# Patient Record
Sex: FEMALE | Race: BLACK OR AFRICAN AMERICAN | NOT HISPANIC OR LATINO | Employment: UNEMPLOYED | URBAN - METROPOLITAN AREA
[De-identification: names, ages, dates, MRNs, and addresses within clinical notes are randomized per-mention and may not be internally consistent; named-entity substitution may affect disease eponyms.]

---

## 2017-03-17 ENCOUNTER — HOSPITAL ENCOUNTER (OUTPATIENT)
Dept: OBSTETRICS AND GYNECOLOGY | Facility: HOSPITAL | Age: 27
Discharge: HOME | End: 2017-03-17
Attending: OBSTETRICS & GYNECOLOGY
Payer: MEDICAID

## 2017-03-17 DIAGNOSIS — Z3A.36 36 WEEKS GESTATION OF PREGNANCY: ICD-10-CM

## 2017-03-17 PROCEDURE — 96372 THER/PROPH/DIAG INJ SC/IM: CPT

## 2017-07-18 ENCOUNTER — HOSPITAL ENCOUNTER (EMERGENCY)
Facility: HOSPITAL | Age: 27
Discharge: HOME | End: 2017-07-18
Attending: EMERGENCY MEDICINE
Payer: MEDICAID

## 2017-07-18 VITALS
BODY MASS INDEX: 26.68 KG/M2 | HEART RATE: 95 BPM | HEIGHT: 62 IN | WEIGHT: 145 LBS | RESPIRATION RATE: 13 BRPM | DIASTOLIC BLOOD PRESSURE: 86 MMHG | TEMPERATURE: 99 F | SYSTOLIC BLOOD PRESSURE: 138 MMHG | OXYGEN SATURATION: 99 %

## 2017-07-18 DIAGNOSIS — K08.89 TOOTHACHE: Primary | ICD-10-CM

## 2017-07-18 DIAGNOSIS — K08.89 PAIN, DENTAL: ICD-10-CM

## 2017-07-18 LAB
B-HCG UR QL: NEGATIVE
CTP QC/QA: YES

## 2017-07-18 PROCEDURE — 99283 EMERGENCY DEPT VISIT LOW MDM: CPT

## 2017-07-18 RX ORDER — IBUPROFEN 600 MG/1
600 TABLET ORAL EVERY 6 HOURS PRN
Qty: 20 TABLET | Refills: 0 | Status: SHIPPED | OUTPATIENT
Start: 2017-07-18

## 2017-07-18 RX ORDER — PENICILLIN V POTASSIUM 500 MG/1
500 TABLET, FILM COATED ORAL 4 TIMES DAILY
Qty: 28 TABLET | Refills: 0 | Status: SHIPPED | OUTPATIENT
Start: 2017-07-18 | End: 2017-07-25

## 2017-07-19 NOTE — ED PROVIDER NOTES
Encounter Date: 2017       History     Chief Complaint   Patient presents with    Dental Pain     right sided dental pain X 2 days.      27-year-old female presents to emergency department complaining of right upper gumline tooth ache ×3 days.  Patient states her dentist is Dr. Tomas however she has not had time to appointment with Dr. Tomas.  She has a silver cap over the tooth that is causing the most pain reports redness to the gums around that area.  Denies trouble swallowing reports pain with eating.  Denies jaw injury or mouth injury.  Denies fevers chills or body aches.  Has not taken any medications.  Has been applying ice pack to face since arrival in the emergency department.          Review of patient's allergies indicates:  No Known Allergies  Past Medical History:   Diagnosis Date    Anemia      Past Surgical History:   Procedure Laterality Date     SECTION      none       Family History   Problem Relation Age of Onset    Asthma Mother     Cancer Maternal Grandmother     Diabetes Maternal Grandmother      Social History   Substance Use Topics    Smoking status: Never Smoker    Smokeless tobacco: Not on file    Alcohol use No     Review of Systems   Constitutional: Negative for activity change, appetite change, chills and fever.   HENT: Positive for dental problem. Negative for congestion, ear pain, postnasal drip, rhinorrhea, sore throat, trouble swallowing and voice change.    Respiratory: Negative for shortness of breath.    Cardiovascular: Negative for chest pain.   Gastrointestinal: Negative for nausea.   Genitourinary: Negative for dysuria.   Musculoskeletal: Negative for back pain.   Skin: Negative for rash.   Neurological: Negative for weakness.   Hematological: Does not bruise/bleed easily.   All other systems reviewed and are negative.      Physical Exam     Initial Vitals [17 2050]   BP Pulse Resp Temp SpO2   138/86 95 13 98.6 °F (37 °C) 99 %      MAP        103.33         Physical Exam    Nursing note and vitals reviewed.  Constitutional: She appears well-developed and well-nourished. No distress.   HENT:   Head: Normocephalic.   Right Ear: Tympanic membrane normal.   Left Ear: Tympanic membrane normal.   Mouth/Throat: Uvula is midline and mucous membranes are normal. No oral lesions. Dental caries present. No dental abscesses or uvula swelling. No oropharyngeal exudate, posterior oropharyngeal edema or posterior oropharyngeal erythema.       Eyes: Conjunctivae are normal.   Neck: Normal range of motion. Neck supple.   Cardiovascular: Normal rate.   Pulmonary/Chest: Breath sounds normal. No respiratory distress.   Abdominal: Soft. Bowel sounds are normal. She exhibits no distension and no abdominal bruit. There is no tenderness. There is no rebound and no guarding. No hernia.   Neurological: She is alert and oriented to person, place, and time.   Skin: Skin is warm and dry.   Psychiatric: She has a normal mood and affect. Her behavior is normal. Judgment and thought content normal.         ED Course   Procedures  Labs Reviewed - No data to display          Medical Decision Making:   Initial Assessment:   27-year-old female presents to emergency department complaining of right upper gumline tooth ache ×3 days.  Patient states her dentist is Dr. Tomas however she has not had time to appointment with Dr. Tomas.  She has a silver cap over the tooth that is causing the most pain reports redness to the gums around that area.  Denies trouble swallowing reports pain with eating.  Denies jaw injury or mouth injury.  Denies fevers chills or body aches.  Has not taken any medications.  Has been applying ice pack to face since arrival in the emergency department.  Patient states icepack is helping with pain.  No signs of abscess in oral cavity.  Gumline is slightly erythematous around painful area.  Area is slightly tender to palpation.  Differential Diagnosis:   Dental  abscess, cavity, jaw fracture, tooth decay  ED Management:  Patient instructed to call Dr. Tomas for a dental appointment.  We'll prescribe antibiotics and ibuprofen.  Patient instructed to use saltwater gargles as needed.  Apply ice to face as needed.  If any symptoms worsen return to emergency department.              Attending Attestation:     Physician Attestation Statement for NP/PA:   I discussed this assessment and plan of this patient with the NP/PA, but I did not personally examine the patient. The face to face encounter was performed by the NP/PA.                  ED Course     Clinical Impression:   The primary encounter diagnosis was Toothache. A diagnosis of Pain, dental was also pertinent to this visit.    Disposition:   Disposition: Discharged  Condition: Stable                        Chandrika Yee NP  07/18/17 2201       Thomas Nettles MD  07/18/17 2202

## 2021-05-21 ENCOUNTER — HOSPITAL ENCOUNTER (OUTPATIENT)
Dept: RADIOLOGY | Facility: HOSPITAL | Age: 31
Discharge: HOME | End: 2021-05-21
Attending: SURGERY
Payer: MEDICAID

## 2021-05-21 ENCOUNTER — HOSPITAL ENCOUNTER (OUTPATIENT)
Dept: CARDIOLOGY | Facility: HOSPITAL | Age: 31
Discharge: HOME | End: 2021-05-21
Attending: SURGERY
Payer: MEDICAID

## 2021-05-21 DIAGNOSIS — Z01.818 PRE-OP EVALUATION: Primary | ICD-10-CM

## 2021-05-21 DIAGNOSIS — Z01.818 PRE-OP EVALUATION: ICD-10-CM

## 2021-05-21 PROCEDURE — 71046 X-RAY EXAM CHEST 2 VIEWS: CPT | Mod: 26,,, | Performed by: RADIOLOGY

## 2021-05-21 PROCEDURE — 71046 X-RAY EXAM CHEST 2 VIEWS: CPT | Mod: TC,FY

## 2021-05-21 PROCEDURE — 93010 ELECTROCARDIOGRAM REPORT: CPT | Mod: ,,, | Performed by: INTERNAL MEDICINE

## 2021-05-21 PROCEDURE — 71046 XR CHEST PA AND LATERAL: ICD-10-PCS | Mod: 26,,, | Performed by: RADIOLOGY

## 2021-05-21 PROCEDURE — 93005 ELECTROCARDIOGRAM TRACING: CPT

## 2021-05-21 PROCEDURE — 93010 EKG 12-LEAD: ICD-10-PCS | Mod: ,,, | Performed by: INTERNAL MEDICINE

## 2022-03-28 ENCOUNTER — LAB VISIT (OUTPATIENT)
Dept: PRIMARY CARE CLINIC | Facility: OTHER | Age: 32
End: 2022-03-28
Attending: INTERNAL MEDICINE
Payer: MEDICAID

## 2022-03-28 DIAGNOSIS — Z20.822 ENCOUNTER FOR LABORATORY TESTING FOR COVID-19 VIRUS: ICD-10-CM

## 2022-03-28 PROCEDURE — U0003 INFECTIOUS AGENT DETECTION BY NUCLEIC ACID (DNA OR RNA); SEVERE ACUTE RESPIRATORY SYNDROME CORONAVIRUS 2 (SARS-COV-2) (CORONAVIRUS DISEASE [COVID-19]), AMPLIFIED PROBE TECHNIQUE, MAKING USE OF HIGH THROUGHPUT TECHNOLOGIES AS DESCRIBED BY CMS-2020-01-R: HCPCS | Performed by: INTERNAL MEDICINE

## 2022-03-29 LAB
SARS-COV-2 RNA RESP QL NAA+PROBE: NOT DETECTED
SARS-COV-2- CYCLE NUMBER: NORMAL

## 2024-05-09 ENCOUNTER — HOSPITAL ENCOUNTER (EMERGENCY)
Facility: HOSPITAL | Age: 34
Discharge: HOME | End: 2024-05-09
Attending: STUDENT IN AN ORGANIZED HEALTH CARE EDUCATION/TRAINING PROGRAM
Payer: MEDICAID

## 2024-05-09 VITALS
HEART RATE: 83 BPM | TEMPERATURE: 98 F | HEIGHT: 62 IN | RESPIRATION RATE: 18 BRPM | BODY MASS INDEX: 32.2 KG/M2 | DIASTOLIC BLOOD PRESSURE: 87 MMHG | SYSTOLIC BLOOD PRESSURE: 158 MMHG | OXYGEN SATURATION: 100 % | WEIGHT: 175 LBS

## 2024-05-09 DIAGNOSIS — K02.9 DENTAL CARIES: ICD-10-CM

## 2024-05-09 DIAGNOSIS — K08.89 PAIN, DENTAL: Primary | ICD-10-CM

## 2024-05-09 LAB
B-HCG UR QL: NEGATIVE
CTP QC/QA: YES

## 2024-05-09 PROCEDURE — 96372 THER/PROPH/DIAG INJ SC/IM: CPT | Performed by: STUDENT IN AN ORGANIZED HEALTH CARE EDUCATION/TRAINING PROGRAM

## 2024-05-09 PROCEDURE — 63600175 PHARM REV CODE 636 W HCPCS: Performed by: STUDENT IN AN ORGANIZED HEALTH CARE EDUCATION/TRAINING PROGRAM

## 2024-05-09 PROCEDURE — 81025 URINE PREGNANCY TEST: CPT | Performed by: PHYSICIAN ASSISTANT

## 2024-05-09 PROCEDURE — 99284 EMERGENCY DEPT VISIT MOD MDM: CPT | Mod: 25

## 2024-05-09 RX ORDER — AMOXICILLIN 875 MG/1
875 TABLET, FILM COATED ORAL 2 TIMES DAILY
Qty: 10 TABLET | Refills: 0 | Status: SHIPPED | OUTPATIENT
Start: 2024-05-09 | End: 2024-05-14

## 2024-05-09 RX ORDER — KETOROLAC TROMETHAMINE 10 MG/1
10 TABLET, FILM COATED ORAL EVERY 6 HOURS
Qty: 20 TABLET | Refills: 0 | Status: SHIPPED | OUTPATIENT
Start: 2024-05-09 | End: 2024-05-14

## 2024-05-09 RX ORDER — KETOROLAC TROMETHAMINE 30 MG/ML
30 INJECTION, SOLUTION INTRAMUSCULAR; INTRAVENOUS
Status: COMPLETED | OUTPATIENT
Start: 2024-05-09 | End: 2024-05-09

## 2024-05-09 RX ORDER — CHLORHEXIDINE GLUCONATE ORAL RINSE 1.2 MG/ML
15 SOLUTION DENTAL 2 TIMES DAILY
Qty: 420 ML | Refills: 0 | Status: SHIPPED | OUTPATIENT
Start: 2024-05-09 | End: 2024-05-23

## 2024-05-09 RX ADMIN — KETOROLAC TROMETHAMINE 30 MG: 30 INJECTION, SOLUTION INTRAMUSCULAR at 02:05

## 2024-05-09 NOTE — ED PROVIDER NOTES
Encounter Date: 2024       History     Chief Complaint   Patient presents with    Dental Pain     C/o pain to right posterior molar x3 days.  Tooth #32 is cracked.  10/10 pain reported.  No medications taken pta.      34 y.o. female who has a past medical history of Anemia presents to emergency department due to dental pain that has been ongoing for the past 3 days.  Patient reports pain is throbbing in nature and has been becoming more persistent.  She denies any fevers or chills, denies any facial swelling.  Denies any recent injuries to her teeth.  Denies any prior symptoms.  She reports she was not seen a dentist for this pain as of yet.        Review of patient's allergies indicates:  No Known Allergies  Past Medical History:   Diagnosis Date    Anemia      Past Surgical History:   Procedure Laterality Date     SECTION      none       Family History   Problem Relation Name Age of Onset    Asthma Mother      Cancer Maternal Grandmother      Diabetes Maternal Grandmother       Social History     Tobacco Use    Smoking status: Never   Substance Use Topics    Alcohol use: No    Drug use: No     Review of Systems   Constitutional:  Negative for chills and fever.   HENT:  Positive for dental problem.        Physical Exam     Initial Vitals [24 0111]   BP Pulse Resp Temp SpO2   (!) 162/83 78 18 97.9 °F (36.6 °C) 100 %      MAP       --         Physical Exam    Constitutional: She is not diaphoretic. No distress.   HENT:   Head: Normocephalic and atraumatic.   Mouth/Throat: No oral lesions. Abnormal dentition. Dental caries present. No dental abscesses.       Neck: Neck supple.   Normal range of motion.  Pulmonary/Chest: Breath sounds normal.   Musculoskeletal:      Cervical back: Normal range of motion and neck supple.     Lymphadenopathy:     She has no cervical adenopathy.         ED Course   Procedures  Labs Reviewed   POCT URINE PREGNANCY          Imaging Results    None          Medications    ketorolac injection 30 mg (30 mg Intramuscular Given 5/9/24 0206)     Medical Decision Making:   Initial Assessment:   34 y.o. female who has a past medical history of Anemia presents to emergency department due to dental pain that has been ongoing for the past 3 days.  Patient in no acute distress.  Exam notable for dental caries.  Physical exam is benign, without significant facial, tongue, or neck swelling to suggest cellulitis, abscess, or Ryne's angina. The patient is tolerating secretions without drooling, dysphagia, or voice changes to suggest deep space neck infection. There is no intraoral or gingival fluctuance to suggest abscess requiring incision/drainage at this time. However, due to inability to completely rule out pulpitis or periapical abscess, plan to cover with PO amoxicillin x5 days. Recommend symptomatic care with NSAIDs for pain in interim. Patient was instructed to follow-up with a dentist as soon as possible for further evaluation and tooth extraction if needed.    Differential Diagnosis:   Differential Diagnosis includes, but is not limited to:  Ryne's angina, acute necrotizing ulcerative gingivitis, epiglottitis, parotitis, gingival abscess, facial cellulitis, peritonsillar/retropharyngeal abscess, sialolithiasis, periapical abscess, pulpitis, dental fracture, dental caries, aphthous ulcer.   Clinical Tests:   Lab Tests: Ordered and Reviewed             ED Course as of 05/09/24 0217   Thu May 09, 2024   0208 Pt is currently stable for discharge. I see no indication of an emergent process beyond that addressed during our encounter but have duly counseled the patient/family regarding the need for prompt follow-up as well as the indications that should prompt immediate return to the emergency room should new or worrisome developments occur. I discussed the ED work up and diagnostic findings with the patient/family. The patient/family has been provided with verbal and printed direction  regarding our final diagnosis(es) as well as instructions regarding use of OTC and/or Rx medications intended to manage the patient's aforementioned conditions. The patient/family verbalized an understanding. The patient/family is asked if there are any questions or concerns. We discuss the case, until all issues are addressed to the patient/family's satisfaction. Patient/family understands and is agreeable to the plan.  [AS]      ED Course User Index  [AS] Alfredo Wood MD          Medical Decision Making  Risk  Prescription drug management.           Clinical Impression:   Final diagnoses:  [K08.89] Pain, dental (Primary)  [K02.9] Dental caries          ED Disposition Condition    Discharge Stable          ED Prescriptions       Medication Sig Dispense Start Date End Date Auth. Provider    amoxicillin (AMOXIL) 875 MG tablet Take 1 tablet (875 mg total) by mouth 2 (two) times daily. for 5 days 10 tablet 5/9/2024 5/14/2024 Alfredo Wood MD    chlorhexidine (PERIDEX) 0.12 % solution Use as directed 15 mLs in the mouth or throat 2 (two) times daily. for 14 days 420 mL 5/9/2024 5/23/2024 Alfredo Wood MD    ketorolac (TORADOL) 10 mg tablet Take 1 tablet (10 mg total) by mouth every 6 (six) hours. for 5 days 20 tablet 5/9/2024 5/14/2024 Alfredo Wood MD          Follow-up Information       Follow up With Specialties Details Why Contact Info    Fabian Avalos MD Obstetrics Schedule an appointment as soon as possible for a visit  for reassesment [address] [facility] [address] [phone]      Dentist  Schedule an appointment as soon as possible for a visit  for reassesment     [facility]  If symptoms worsen [address] [facility] [place] [address] [phone]            DISCLAIMER: This note was prepared with [employer] voice recognition transcription software. Garbled syntax, mangled pronouns, and other bizarre  constructions may be attributed to that software system.     Alfredo Wood MD  05/09/24 0216

## 2024-05-09 NOTE — DISCHARGE INSTRUCTIONS
Thank you for coming to our Emergency Department today. It is important to remember that some problems are difficult to diagnose and may not be found during your first visit. Be sure to follow up with your primary care doctor and review any labs/imaging that was performed with them. If you do not have a primary care doctor, you may contact the one listed on your discharge paperwork or you may also call the [facility] Appointment Desk at [phone] to schedule an appointment with one.     All medications may potentially have side effects and it is impossible to predict which medications may give you side effects. If you feel that you are having a negative effect of any medication you should immediately stop taking them and seek medical attention.    Return to the ER with any questions/concerns, new/concerning symptoms, worsening or failure to improve. Do not drive or make any important decisions for 24 hours if you have received any pain medications, sedatives or mood altering drugs during your ER visit.

## 2024-05-09 NOTE — ED TRIAGE NOTES
Patient presents to the ED c/o 10/10 dental pain x3 days. Tooth # 32 is cracked, unsure of when she cracked it. Denies taking pain medication. Does not have an established dentist.